# Patient Record
Sex: FEMALE | Race: WHITE | NOT HISPANIC OR LATINO | URBAN - METROPOLITAN AREA
[De-identification: names, ages, dates, MRNs, and addresses within clinical notes are randomized per-mention and may not be internally consistent; named-entity substitution may affect disease eponyms.]

---

## 2021-07-06 ENCOUNTER — OFFICE VISIT (OUTPATIENT)
Dept: URBAN - METROPOLITAN AREA CLINIC 40 | Facility: CLINIC | Age: 61
End: 2021-07-06
Payer: COMMERCIAL

## 2021-07-06 ENCOUNTER — LAB OUTSIDE AN ENCOUNTER (OUTPATIENT)
Dept: URBAN - METROPOLITAN AREA CLINIC 40 | Facility: CLINIC | Age: 61
End: 2021-07-06

## 2021-07-06 ENCOUNTER — WEB ENCOUNTER (OUTPATIENT)
Dept: URBAN - METROPOLITAN AREA CLINIC 40 | Facility: CLINIC | Age: 61
End: 2021-07-06

## 2021-07-06 VITALS
HEART RATE: 86 BPM | WEIGHT: 186 LBS | TEMPERATURE: 97.7 F | DIASTOLIC BLOOD PRESSURE: 80 MMHG | HEIGHT: 67 IN | SYSTOLIC BLOOD PRESSURE: 140 MMHG | BODY MASS INDEX: 29.19 KG/M2

## 2021-07-06 DIAGNOSIS — R19.5 LOOSE STOOLS: ICD-10-CM

## 2021-07-06 PROCEDURE — 99213 OFFICE O/P EST LOW 20 MIN: CPT | Performed by: INTERNAL MEDICINE

## 2021-07-06 RX ORDER — ERGOCALCIFEROL 1.25 MG/1
CAPSULE ORAL
Qty: 0 | Refills: 0 | Status: ACTIVE | COMMUNITY
Start: 1900-01-01

## 2021-07-06 RX ORDER — LEVOTHYROXINE SODIUM 75 UG/1
TABLET ORAL
Qty: 0 | Refills: 0 | Status: ACTIVE | COMMUNITY
Start: 1900-01-01

## 2021-07-06 NOTE — PHYSICAL EXAM NEUROLOGIC:
nonfocal , oriented to person, place and time , short and long term memory intact , normal sensation

## 2021-07-06 NOTE — HPI-TODAY'S VISIT:
Ms. Bullock is a 61 year old White female last seen by Dr. Eldridge late 2018, who returns to the office today with the complaint of at least 4 weeks of what she believes to be a precipitated loose stool 3-4 times a day without rectal bleeding or abdominal pain.  States that she has not been on any antibiotics, rare use of NSAIDs or significant alcohol.  No significant stress or anxiety.  She did travel to the [place] area several weeks ago but admits that the loose stool had occurred 2 weeks prior to this travel.  No ill other ill household members with diarrhea.  Describes no nausea, vomiting or fever.  Has a fairly balanced diet she believes is drinking enough water.  Last colonoscopy reportedly in 2013.  No reported family history of colon cancer. GERD, gastritis and small hiatal hernia with EGD late 2018 by Dr. Eldridge.  Non-smoker.  Does not have history of diabetes.  She does have history of hypothyroidism.  I do not have any recent thyroid panel to review. Patient states levels normal recently.

## 2021-07-15 LAB
FECAL FAT, QUALITATIVE: (no result)
GASTROINTESTINAL PATHOGEN: (no result)
PANCREATICELASTASE ELISA, STOOL: (no result)

## 2021-08-04 ENCOUNTER — OFFICE VISIT (OUTPATIENT)
Dept: URBAN - METROPOLITAN AREA CLINIC 40 | Facility: CLINIC | Age: 61
End: 2021-08-04
Payer: COMMERCIAL

## 2021-08-04 VITALS
TEMPERATURE: 98.1 F | HEIGHT: 67 IN | DIASTOLIC BLOOD PRESSURE: 78 MMHG | HEART RATE: 85 BPM | SYSTOLIC BLOOD PRESSURE: 144 MMHG | WEIGHT: 186 LBS | BODY MASS INDEX: 29.19 KG/M2

## 2021-08-04 DIAGNOSIS — R19.5 LOOSE STOOLS: ICD-10-CM

## 2021-08-04 PROCEDURE — 99213 OFFICE O/P EST LOW 20 MIN: CPT | Performed by: INTERNAL MEDICINE

## 2021-08-04 RX ORDER — ERGOCALCIFEROL 1.25 MG/1
CAPSULE ORAL
Qty: 0 | Refills: 0 | Status: ACTIVE | COMMUNITY
Start: 1900-01-01

## 2021-08-04 RX ORDER — LEVOTHYROXINE SODIUM 75 UG/1
TABLET ORAL
Qty: 0 | Refills: 0 | Status: ACTIVE | COMMUNITY
Start: 1900-01-01

## 2021-08-04 NOTE — HPI-TODAY'S VISIT:
Ms. Bullock is a 61 year old White female last seen 7/6/21 with the complaint of at least 4 weeks of what she believes to be a precipitated loose stool 3-4 times a day without rectal bleeding or abdominal pain.  States that she has not been on any antibiotics, rare use of NSAIDs or significant alcohol.  No significant stress or anxiety.  She did travel to the [place] area several weeks ago but admits that the loose stool had occurred 2 weeks prior to this travel.  No ill other ill household members with diarrhea.  Describes no nausea, vomiting or fever.  Has a fairly balanced diet she believes is drinking enough water.  Last colonoscopy reportedly in 2013.  No reported family history of colon cancer. GERD, gastritis and small hiatal hernia with EGD late 2018 by Dr. Eldridge.  Non-smoker.  Does not have history of diabetes.  She does have history of hypothyroidism.  I do not have any recent thyroid panel to review. Patient states levels normal recently. GPP negative. Fecal fat normal with severely decreased elastase. Stool was watery with collection, per patient. She did note decrease in frequency of diarrhea and the stools are not as watery as before but still occurs.  Has loose stools even without eating.  Admits that for breakfast her diet is regular eating eggs and sausage at times and other times may skip breakfast.  Does not seem to have a high-fiber diet but does drink adequate water.  Believes she may have had minimal wipe bleeding recently with loose stool.  Otherwise, her appetite is preserved.  Her weight is unchanged from prior visit a month ago.

## 2021-08-30 ENCOUNTER — DASHBOARD ENCOUNTERS (OUTPATIENT)
Age: 61
End: 2021-08-30

## 2021-09-02 ENCOUNTER — OFFICE VISIT (OUTPATIENT)
Dept: URBAN - METROPOLITAN AREA CLINIC 40 | Facility: CLINIC | Age: 61
End: 2021-09-02
Payer: COMMERCIAL

## 2021-09-02 VITALS
WEIGHT: 187 LBS | HEIGHT: 67 IN | SYSTOLIC BLOOD PRESSURE: 128 MMHG | HEART RATE: 76 BPM | DIASTOLIC BLOOD PRESSURE: 82 MMHG | TEMPERATURE: 97.9 F | BODY MASS INDEX: 29.35 KG/M2

## 2021-09-02 DIAGNOSIS — R19.5 LOOSE STOOLS: ICD-10-CM

## 2021-09-02 PROCEDURE — 99213 OFFICE O/P EST LOW 20 MIN: CPT | Performed by: INTERNAL MEDICINE

## 2021-09-02 RX ORDER — PANCRELIPASE 36000; 180000; 114000 [USP'U]/1; [USP'U]/1; [USP'U]/1
AS DIRECTED CAPSULE, DELAYED RELEASE PELLETS ORAL
Qty: 720 | Refills: 1 | OUTPATIENT
Start: 2021-09-03 | End: 2022-03-01

## 2021-09-02 RX ORDER — LEVOTHYROXINE SODIUM 75 UG/1
TABLET ORAL
Qty: 0 | Refills: 0 | Status: ACTIVE | COMMUNITY
Start: 1900-01-01

## 2021-09-02 RX ORDER — ERGOCALCIFEROL 1.25 MG/1
CAPSULE ORAL
Qty: 0 | Refills: 0 | Status: ACTIVE | COMMUNITY
Start: 1900-01-01

## 2021-09-02 NOTE — HPI-TODAY'S VISIT:
Ms. Bullock is a 61 year old White female last seen 8/4/21 for evaluation of diarrhea. States that she has not been on any antibiotics, rare use of NSAIDs or significant alcohol.  No significant stress or anxiety.  She did travel to the [place] area months ago but admits that the loose stool had occurred 2 weeks prior to this travel.  No ill other ill household members with diarrhea.  Describes no nausea, vomiting or fever.  Has a fairly balanced diet she believes is drinking enough water.  Last colonoscopy reportedly in 2013.  No reported family history of colon cancer. GERD, gastritis and small hiatal hernia with EGD late 2018 by Dr. Eldridge.  Non-smoker.  Does not have history of diabetes.  She does have history of hypothyroidism.  I do not have any recent thyroid panel to review. Patient states levels normal recently. GPP negative. Fecal fat normal with severely decreased elastase. Stool was watery with collection, per patient. She did note decrease in frequency of diarrhea and the stools are not as watery as before but still occurs.  Has loose stools even without eating.  Admits that for breakfast her diet is regular eating eggs and sausage at times and other times may skip breakfast.  Does not seem to have a high-fiber diet but does drink adequate water.  Believes she may have had minimal wipe bleeding recently with loose stool.  Otherwise, her appetite is preserved.  Her weight is unchanged from prior visit a month ago. Given trial of Creon last visit and believes samples helped. Monitoring diet which also seems to help, avoiding fried foods, sweets. Has traveled again since last visit.

## 2021-12-15 ENCOUNTER — TELEPHONE ENCOUNTER (OUTPATIENT)
Dept: URBAN - METROPOLITAN AREA CLINIC 40 | Facility: CLINIC | Age: 61
End: 2021-12-15

## 2022-01-04 ENCOUNTER — OFFICE VISIT (OUTPATIENT)
Dept: URBAN - METROPOLITAN AREA CLINIC 40 | Facility: CLINIC | Age: 62
End: 2022-01-04

## 2022-01-18 ENCOUNTER — ERX REFILL RESPONSE (OUTPATIENT)
Dept: URBAN - METROPOLITAN AREA CLINIC 40 | Facility: CLINIC | Age: 62
End: 2022-01-18

## 2022-01-18 RX ORDER — PANCRELIPASE 36000; 180000; 114000 [USP'U]/1; [USP'U]/1; [USP'U]/1
TAKE TWO (2) CAPSULES BY MOUTH WITH MEALS  AND ONE (1) CAPSULE WITH EACH SNACK AS DIRECTED CAPSULE, DELAYED RELEASE PELLETS ORAL
Qty: 720 CAPSULE | Refills: 2 | OUTPATIENT